# Patient Record
Sex: FEMALE | Race: BLACK OR AFRICAN AMERICAN | NOT HISPANIC OR LATINO | Employment: STUDENT | URBAN - METROPOLITAN AREA
[De-identification: names, ages, dates, MRNs, and addresses within clinical notes are randomized per-mention and may not be internally consistent; named-entity substitution may affect disease eponyms.]

---
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
## 2024-03-06 ENCOUNTER — PATIENT MESSAGE (OUTPATIENT)
Dept: PEDIATRIC NEUROLOGY | Facility: CLINIC | Age: 19
End: 2024-03-06
Payer: COMMERCIAL

## 2024-08-23 ENCOUNTER — PATIENT MESSAGE (OUTPATIENT)
Dept: PSYCHIATRY | Facility: CLINIC | Age: 19
End: 2024-08-23
Payer: COMMERCIAL

## 2024-08-23 ENCOUNTER — PATIENT MESSAGE (OUTPATIENT)
Dept: PEDIATRIC NEUROLOGY | Facility: CLINIC | Age: 19
End: 2024-08-23
Payer: COMMERCIAL

## 2024-09-05 ENCOUNTER — PATIENT MESSAGE (OUTPATIENT)
Dept: PEDIATRIC NEUROLOGY | Facility: CLINIC | Age: 19
End: 2024-09-05

## 2024-09-05 ENCOUNTER — OFFICE VISIT (OUTPATIENT)
Dept: PEDIATRIC NEUROLOGY | Facility: CLINIC | Age: 19
End: 2024-09-05
Payer: COMMERCIAL

## 2024-09-05 VITALS
HEIGHT: 66 IN | HEART RATE: 98 BPM | SYSTOLIC BLOOD PRESSURE: 116 MMHG | BODY MASS INDEX: 24.77 KG/M2 | DIASTOLIC BLOOD PRESSURE: 60 MMHG | WEIGHT: 154.13 LBS

## 2024-09-05 DIAGNOSIS — G40.019 LOCALIZATION-RELATED IDIOPATHIC EPILEPSY AND EPILEPTIC SYNDROMES WITH SEIZURES OF LOCALIZED ONSET, INTRACTABLE, WITHOUT STATUS EPILEPTICUS: Primary | ICD-10-CM

## 2024-09-05 DIAGNOSIS — Z96.89 S/P PLACEMENT OF VNS (VAGUS NERVE STIMULATION) DEVICE: ICD-10-CM

## 2024-09-05 PROCEDURE — 99999 PR PBB SHADOW E&M-EST. PATIENT-LVL III: CPT | Mod: PBBFAC,,, | Performed by: NURSE PRACTITIONER

## 2024-09-05 RX ORDER — OXCARBAZEPINE 600 MG/1
2 TABLET ORAL DAILY
Qty: 60 TABLET | Refills: 5 | Status: SHIPPED | OUTPATIENT
Start: 2024-09-05

## 2024-09-05 NOTE — PROGRESS NOTES
"  Subjective:      Patient ID: Praveen Shah is a 19 y.o. female with ADD and complex partial seizures presenting for follow up    Interval history:  Presents with mom  No seizures  Needs and MRI of her knee, inquiring about VNS device.  [employer] student, runs track  Injury occurred to her knee during school sports.      Interval history:  Last visit we raised Oxtellar to 1200 mg given having seizure auras.  Has done well since then with no auras or seizures.  Just needs refills.  VNS with no problems or pain/discomfort.  College student, plays softball for [employer].       Last seen by Dr. Rodriguez a year ago.  On Oxtellar 600 mg daily and has VNS  Last clinical seizure > 3 years ago.  She does report the last month or so she has been feeling her seizure aura of eye twitching 1 to 2 times a day.  She has not had a seizure though.  Goes to college, freshman at [employer].  She is driving at this time.      Interval History (11/23/2021):  She was last seen in 3/9/20. Last seizure was a couple of years ago. She had an spell of left side eyelid twitching "beeping" for few hours. She had GI issues around same time for one week when she had "baby pukes". She denies missing any medications.  She was seen in ED for this symptoms, labs unremarkable. She was discharged on anti emetics and her symptoms subsided after few days. She is here to check on VNS.  She is tolerating Oxtellar and VNS well w/o any side effects. She denies any new neurological deficits.   She is off adderrall.  We discussed about this episode less likely to be a seizure or seizure aura and more likely a muscle twitching in the setting of dehydration or electrolytes abnormality.       Previous HPI:  She was last seen on 8/19/19 by me. She was well-controlled with VNS (implanted in November 2015) on Oxtellar 600 mg daily at that time.     Last seizure in January 2018. VNS last interrogated 7/29/19 during admission for L-knee surgery; no changes " to settings made at that times.     She denies and dizziness, drowsiness, headache, or gait problems not related related to knee issues.     Last EEG - 6/05/19 (since patient requesting clearance to drive) Normal     24 hr EEG - 1/08/19 Normal  Routine EEG- 11/11/2021: Normal EEG awake and drowsy    Follow-up  Pertinent negatives include no chills, fatigue, headaches or weakness.   Seizures   Pertinent negatives include no headaches, no speech difficulty and no visual disturbance.       Review of Systems   Constitutional:  Negative for chills and fatigue.   HENT:  Negative for trouble swallowing.    Eyes:  Negative for visual disturbance.   Respiratory: Negative.     Cardiovascular: Negative.    Gastrointestinal: Negative.    Musculoskeletal: Negative.    Skin: Negative.    Neurological:  Negative for dizziness, seizures, speech difficulty, weakness, light-headedness and headaches.   Psychiatric/Behavioral:  Positive for dysphoric mood. Negative for agitation and behavioral problems.    All other systems reviewed and are negative.      Objective:   Neurologic Exam     Mental Status   Oriented to person, place, and time.   Attention: normal. Concentration: normal.   Speech: speech is normal   Level of consciousness: alert    Cranial Nerves     CN III, IV, VI   Pupils are equal, round, and reactive to light.  Extraocular motions are normal.   Nystagmus: none   Diplopia: none  Ophthalmoparesis: none    CN V   Facial sensation intact.     CN VII   Facial expression full, symmetric.     CN VIII   CN VIII normal.     CN IX, X   CN IX normal.     CN XI   CN XI normal.     CN XII   CN XII normal.     Motor Exam   Muscle bulk: normal  Overall muscle tone: normal    Strength   Strength 5/5 throughout.     Sensory Exam   Light touch normal.     Gait, Coordination, and Reflexes     Gait  Gait: normal    Coordination   Finger to nose coordination: normal    Tremor   Resting tremor: absent  Intention tremor: absent  Action  tremor: absent    Reflexes   Right brachioradialis: 2+  Left brachioradialis: 2+  Right biceps: 2+  Left biceps: 2+  Right triceps: 2+  Left triceps: 2+  Right patellar: 2+  Left patellar: 2+  Right achilles: 2+  Left achilles: 2+      Physical Exam  Constitutional:       General: She is not in acute distress.     Appearance: She is well-developed. She is not diaphoretic.   HENT:      Head: Normocephalic and atraumatic.   Eyes:      Extraocular Movements: EOM normal.      Pupils: Pupils are equal, round, and reactive to light.   Pulmonary:      Effort: Pulmonary effort is normal.   Musculoskeletal:         General: Normal range of motion.      Cervical back: Normal range of motion.   Neurological:      General: No focal deficit present.      Mental Status: She is oriented to person, place, and time. Mental status is at baseline.      Cranial Nerves: No cranial nerve deficit.      Motor: Motor strength is normal.No weakness.      Coordination: Coordination normal. Finger-Nose-Finger Test normal.      Gait: Gait is intact. Gait normal.      Deep Tendon Reflexes:      Reflex Scores:       Tricep reflexes are 2+ on the right side and 2+ on the left side.       Bicep reflexes are 2+ on the right side and 2+ on the left side.       Brachioradialis reflexes are 2+ on the right side and 2+ on the left side.       Patellar reflexes are 2+ on the right side and 2+ on the left side.       Achilles reflexes are 2+ on the right side and 2+ on the left side.  Psychiatric:         Mood and Affect: Mood is depressed.         Speech: Speech normal.         Behavior: Behavior is withdrawn.         Thought Content: Thought content normal.         Judgment: Judgment normal.       Pertinent Labs 6/2019:  Normal CMP and TFT    Assessment:   19 y.o. Female with ADD and complex focal seizures, on XR OXC and doing well. Will need VNS turned off for MRI; my staff can arrange. We discussed transition to adult neurology. I would not recommend  coming off medications. Its a combination of both medications and VNS that has made her seizure free.     Plan:   Cont Oxtellar 1200mg   Vns interrogated, no changes made.  Seizure precautions and seizure first aid were discussed with the patient  Family was instructed to contact either the primary care physician office or our office by telephone if there is any deterioration in his neurologic status, change in presenting symptoms, lack of beneficial response to treatment plan, or signs of adverse effects of current therapies, all of which were reviewed.    Transition to adult neurology        VNS interrogated:    VNS settings  Sentiva  S/N: [id number]  Implanted 11/13/20     OC mA                        2.0  Frequency Hz              30  Pulse width usec         250  ON sec                        30  OFF min                      5.0  Magnet OC                  2.25  Mag freq                      30  Mag PW                      250 to 500  Mag ON                       60  Auto-stim OC              2.0  A.S. PW                      250  A.S. ON                       60  Sensitivity                    20%  Impedence Ohms       1304  Battery                       50 to 75%      Grace Norman DNP, APRN, FNP-C  Pediatric Neurology Nurse Practitioner  Instructor of Pediatric Neurology     TIME SPENT IN ENCOUNTER : I spent 30 minutes face to face with the patient and family; > 50% was spent counseling them regarding findings from the available records including test/study results and their meaning, the diagnosis/differential diagnosis, diagnostic/treatment recommendations, therapeutic options, risks and benefits of management options, prognosis, plan/ instructions for management/use of medications, education, compliance and risk-factor reduction as well as in coordination of care and follow up plans.

## 2024-09-06 ENCOUNTER — OFFICE VISIT (OUTPATIENT)
Dept: PSYCHIATRY | Facility: CLINIC | Age: 19
End: 2024-09-06
Payer: COMMERCIAL

## 2024-09-06 VITALS
SYSTOLIC BLOOD PRESSURE: 121 MMHG | WEIGHT: 156.63 LBS | DIASTOLIC BLOOD PRESSURE: 67 MMHG | HEART RATE: 103 BPM | BODY MASS INDEX: 25.41 KG/M2

## 2024-09-06 DIAGNOSIS — F90.0 ADHD (ATTENTION DEFICIT HYPERACTIVITY DISORDER), INATTENTIVE TYPE: Primary | ICD-10-CM

## 2024-09-06 PROCEDURE — 99999 PR PBB SHADOW E&M-EST. PATIENT-LVL II: CPT | Mod: PBBFAC,,, | Performed by: PSYCHIATRY & NEUROLOGY

## 2024-09-06 RX ORDER — DEXTROAMPHETAMINE SACCHARATE, AMPHETAMINE ASPARTATE MONOHYDRATE, DEXTROAMPHETAMINE SULFATE AND AMPHETAMINE SULFATE 3.75; 3.75; 3.75; 3.75 MG/1; MG/1; MG/1; MG/1
15 CAPSULE, EXTENDED RELEASE ORAL DAILY
Qty: 30 CAPSULE | Refills: 0 | Status: SHIPPED | OUTPATIENT
Start: 2024-10-06 | End: 2024-11-05

## 2024-09-06 RX ORDER — DEXTROAMPHETAMINE SACCHARATE, AMPHETAMINE ASPARTATE MONOHYDRATE, DEXTROAMPHETAMINE SULFATE AND AMPHETAMINE SULFATE 3.75; 3.75; 3.75; 3.75 MG/1; MG/1; MG/1; MG/1
15 CAPSULE, EXTENDED RELEASE ORAL DAILY
Qty: 30 CAPSULE | Refills: 0 | Status: SHIPPED | OUTPATIENT
Start: 2024-11-05 | End: 2024-12-05

## 2024-09-06 RX ORDER — DEXTROAMPHETAMINE SACCHARATE, AMPHETAMINE ASPARTATE MONOHYDRATE, DEXTROAMPHETAMINE SULFATE AND AMPHETAMINE SULFATE 3.75; 3.75; 3.75; 3.75 MG/1; MG/1; MG/1; MG/1
15 CAPSULE, EXTENDED RELEASE ORAL DAILY
Qty: 30 CAPSULE | Refills: 0 | Status: SHIPPED | OUTPATIENT
Start: 2024-09-06 | End: 2024-10-06

## 2024-09-06 NOTE — PROGRESS NOTES
"Outpatient Psychiatry Follow-Up Visit with MD    9/6/2024    Last appointment:12/30/2022    Last in person appointment:9/6/2024    Clinical Status of Patient: Outpatient (Ambulatory)    IDENTIFYING DATA:    Child's Name: Praveen Shah  Grade: graduated high school in 2022 and freshman at [employer] 2022-23 and is prakash 2024-25  School:  [employer]  Parent:   Madison Nicolas  Grandparent    Consuelo,Celi  Mother    Alyssa, Smith  Father       The patient location is: [facility]  The chief complaint leading to consultation is: inattention    Visit type: in person     Face to Face time with patient: 20 minutes    30 minutes of total time spent on the encounter, which includes face to face time and non-face to face time preparing to see the patient (eg, review of tests), Obtaining and/or reviewing separately obtained history, Documenting clinical information in the electronic or other health record, Independently interpreting results (not separately reported) and communicating results to the patient/family/caregiver, or Care coordination (not separately reported).         Each patient to whom he or she provides medical services by telemedicine is:  (1) informed of the relationship between the physician and patient and the respective role of any other health care provider with respect to management of the patient; and (2) notified that he or she may decline to receive medical services by telemedicine and may withdraw from such care at any time.    Notes:      Site:  [facility]    Praveen Shah is a 19 y.o. female who has been lost to follow up. She was last seen 12/30/2022.    Chief Complaint:  "I am struggling in school without my Adderall XR."    Interval History and Content of Current Session:  Interim Events/Subjective Report/Content of Current Session:     "I need to get back on my Adderall XR because I just sit in class not paying attention at all."    "I am living in the dorm. I don't have a " "roommate."    "I run track but I got hurt. 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 Last seen 12/30/2022. Per [employer] last filled Adderall XR on 12/30/2022.    "School just started and I am just tired."    No new medications  No new medical issues  VNS replaced in 2020    L knee injury      "I am on scholarship for my knee."    "I am not depressed." Recent neuro note indicated such and when I asked about it she smiled brightly and said "I am not depressed at all."    "I am on track to graduate in 4 years."      Review of Systems   Review of Systems    No tic  No tremor  No drug use  No MJ      Past Medical, Family and Social History: The patient's past medical, family and social history have been reviewed and updated as appropriate within the electronic medical record - see encounter notes.    VNS  Oxtellar 600 mg BID    Compliance: N/A    Side effects: N/A    Risk Parameters:  Patient reports no suicidal ideation  Patient reports no homicidal ideation  Patient reports no self-injurious behavior  Patient reports no violent behavior    Wt Readings from Last 3 Encounters:   09/06/24 71.1 kg (156 lb 10.2 oz)   09/05/24 69.9 kg (154 lb 1.6 oz)   08/17/23 69.3 kg (152 lb 12.5 oz) (84%, Z= 0.99)*     * Growth percentiles are based on CDC (Girls, 2-20 Years) data.     Temp Readings from Last 3 Encounters:   11/18/21 98.1 °F (36.7 °C) (Temporal)   11/09/21 98.1 °F (36.7 °C) (Oral)   01/26/21 96.3 °F (35.7 °C) (Oral)     BP Readings from Last 3 Encounters:   09/06/24 121/67   09/05/24 116/60   08/17/23 110/65     Pulse Readings from Last 3 Encounters:   09/06/24 103   09/05/24 98   08/17/23 86       Exam (detailed: at least 9 elements; comprehensive: all 15 elements)   Constitutional  Vitals:  Most " recent vital signs, dated 9/6/2024, were reviewed.   There were no vitals filed for this visit.     General:  unremarkable, age appropriate, normal weight, casually dressed, neatly groomed     Musculoskeletal  Muscle Strength/Tone:  no tremor, no tic   Gait & Station:  non-ataxic     Psychiatric:  Appearance: unremarkable, age appropriate, casually dressed, neatly groomed  Behavior/Cooperation: normal, cooperative, eye contact normal  Speech: normal tone, normal rate, normal pitch, normal volume, spontaneous  Mood: steady, euthymic  Affect:  full  Thought Process: normal and logical  Thought Content: normal, no suicidality, no homicidality, delusions, or paranoia  Sensorium: person, place, situation, time/date, day of week, month of year, year  Alert and Oriented: x5  Memory: intact to recent and remote events  Attention/concentration: able to attend to interview  Abstract reasoning: age-appropriate  Insight: age-appropriate  Judgment: age-appropriate     No visits with results within 1 Month(s) from this visit.   Latest known visit with results is:   Lab Visit on 09/14/2022   Component Date Value Ref Range Status    Sodium 09/14/2022 138  136 - 145 mmol/L Final    Potassium 09/14/2022 3.9  3.5 - 5.1 mmol/L Final    Chloride 09/14/2022 108  95 - 110 mmol/L Final    CO2 09/14/2022 26  23 - 29 mmol/L Final    Glucose 09/14/2022 85  70 - 110 mg/dL Final    BUN 09/14/2022 11  6 - 20 mg/dL Final    Creatinine 09/14/2022 0.8  0.5 - 1.4 mg/dL Final    Calcium 09/14/2022 8.7  8.7 - 10.5 mg/dL Final    Total Protein 09/14/2022 7.1  6.0 - 8.4 g/dL Final    Albumin 09/14/2022 3.9  3.2 - 4.7 g/dL Final    Total Bilirubin 09/14/2022 0.3  0.1 - 1.0 mg/dL Final    Alkaline Phosphatase 09/14/2022 71  48 - 95 U/L Final    AST 09/14/2022 23  10 - 40 U/L Final    ALT 09/14/2022 13  10 - 44 U/L Final    Anion Gap 09/14/2022 4 (L)  8 - 16 mmol/L Final    eGFR 09/14/2022 SEE COMMENT  >60 mL/min/1.73 m^2 Final    Oxcarbazepine 09/14/2022  9 (L)  10 - 35 mcg/mL Final       Assessment and Diagnosis     General Impression:  She is studying excessive amounts and not keeping up with the work in school at time. Poor focus in the classroom makes for longer time studying attempting to catch up what was missed..      ICD-10-CM ICD-9-CM   1. ADHD (attention deficit hyperactivity disorder), inattentive type  F90.0 314.00     Intervention/Counseling/Treatment Plan   Adderall XR 15 mg daily     Return to Clinic: 3 months-virtual

## 2024-09-06 NOTE — LETTER
September 6, 2024    Pravene Shah  [address] [address]             [facility] [address]  Child and Adolescent Psychiatry  [facility]  [place] [address]  Phone: [phone]   September 6, 2024     Patient: Praveen Shah   YOB: 2005   Date of Visit: 9/6/2024       To Whom it May Concern:    Praveen Shah was seen in my clinic on 9/6/2024.    Please excuse her from any classes or work missed.    If you have any questions or concerns, please don't hesitate to call.    Sincerely,              Duke Vergara MD

## 2024-09-11 ENCOUNTER — PATIENT MESSAGE (OUTPATIENT)
Dept: PEDIATRIC NEUROLOGY | Facility: CLINIC | Age: 19
End: 2024-09-11
Payer: COMMERCIAL

## 2024-09-12 ENCOUNTER — TELEPHONE (OUTPATIENT)
Dept: PEDIATRIC NEUROLOGY | Facility: CLINIC | Age: 19
End: 2024-09-12
Payer: COMMERCIAL

## 2025-01-07 ENCOUNTER — OFFICE VISIT (OUTPATIENT)
Dept: PSYCHIATRY | Facility: CLINIC | Age: 20
End: 2025-01-07
Payer: COMMERCIAL

## 2025-01-07 VITALS
BODY MASS INDEX: 25.5 KG/M2 | SYSTOLIC BLOOD PRESSURE: 127 MMHG | WEIGHT: 157.19 LBS | DIASTOLIC BLOOD PRESSURE: 68 MMHG | HEART RATE: 95 BPM

## 2025-01-07 DIAGNOSIS — F40.298 SPECIFIC PHOBIA: ICD-10-CM

## 2025-01-07 DIAGNOSIS — F90.0 ADHD (ATTENTION DEFICIT HYPERACTIVITY DISORDER), INATTENTIVE TYPE: Primary | ICD-10-CM

## 2025-01-07 PROCEDURE — G2211 COMPLEX E/M VISIT ADD ON: HCPCS | Mod: S$GLB,,, | Performed by: PSYCHIATRY & NEUROLOGY

## 2025-01-07 PROCEDURE — 3008F BODY MASS INDEX DOCD: CPT | Mod: CPTII,S$GLB,, | Performed by: PSYCHIATRY & NEUROLOGY

## 2025-01-07 PROCEDURE — 99214 OFFICE O/P EST MOD 30 MIN: CPT | Mod: S$GLB,,, | Performed by: PSYCHIATRY & NEUROLOGY

## 2025-01-07 PROCEDURE — 3078F DIAST BP <80 MM HG: CPT | Mod: CPTII,S$GLB,, | Performed by: PSYCHIATRY & NEUROLOGY

## 2025-01-07 PROCEDURE — 3074F SYST BP LT 130 MM HG: CPT | Mod: CPTII,S$GLB,, | Performed by: PSYCHIATRY & NEUROLOGY

## 2025-01-07 PROCEDURE — 99999 PR PBB SHADOW E&M-EST. PATIENT-LVL II: CPT | Mod: PBBFAC,,, | Performed by: PSYCHIATRY & NEUROLOGY

## 2025-01-07 RX ORDER — DEXTROAMPHETAMINE SACCHARATE, AMPHETAMINE ASPARTATE MONOHYDRATE, DEXTROAMPHETAMINE SULFATE AND AMPHETAMINE SULFATE 5; 5; 5; 5 MG/1; MG/1; MG/1; MG/1
20 CAPSULE, EXTENDED RELEASE ORAL EVERY MORNING
Qty: 30 CAPSULE | Refills: 0 | Status: SHIPPED | OUTPATIENT
Start: 2025-03-08 | End: 2025-04-07

## 2025-01-07 RX ORDER — DEXTROAMPHETAMINE SACCHARATE, AMPHETAMINE ASPARTATE MONOHYDRATE, DEXTROAMPHETAMINE SULFATE AND AMPHETAMINE SULFATE 3.75; 3.75; 3.75; 3.75 MG/1; MG/1; MG/1; MG/1
15 CAPSULE, EXTENDED RELEASE ORAL DAILY
Qty: 30 CAPSULE | Refills: 0 | Status: CANCELLED | OUTPATIENT
Start: 2025-02-06 | End: 2025-03-08

## 2025-01-07 RX ORDER — DEXTROAMPHETAMINE SACCHARATE, AMPHETAMINE ASPARTATE MONOHYDRATE, DEXTROAMPHETAMINE SULFATE AND AMPHETAMINE SULFATE 3.75; 3.75; 3.75; 3.75 MG/1; MG/1; MG/1; MG/1
15 CAPSULE, EXTENDED RELEASE ORAL DAILY
Qty: 30 CAPSULE | Refills: 0 | Status: CANCELLED | OUTPATIENT
Start: 2025-03-08 | End: 2025-04-07

## 2025-01-07 RX ORDER — DEXTROAMPHETAMINE SACCHARATE, AMPHETAMINE ASPARTATE MONOHYDRATE, DEXTROAMPHETAMINE SULFATE AND AMPHETAMINE SULFATE 5; 5; 5; 5 MG/1; MG/1; MG/1; MG/1
20 CAPSULE, EXTENDED RELEASE ORAL EVERY MORNING
Qty: 30 CAPSULE | Refills: 0 | Status: SHIPPED | OUTPATIENT
Start: 2025-02-06 | End: 2025-03-08

## 2025-01-07 RX ORDER — DEXTROAMPHETAMINE SACCHARATE, AMPHETAMINE ASPARTATE MONOHYDRATE, DEXTROAMPHETAMINE SULFATE AND AMPHETAMINE SULFATE 5; 5; 5; 5 MG/1; MG/1; MG/1; MG/1
20 CAPSULE, EXTENDED RELEASE ORAL EVERY MORNING
Qty: 30 CAPSULE | Refills: 0 | Status: SHIPPED | OUTPATIENT
Start: 2025-01-07 | End: 2025-02-06

## 2025-01-07 RX ORDER — DEXTROAMPHETAMINE SACCHARATE, AMPHETAMINE ASPARTATE MONOHYDRATE, DEXTROAMPHETAMINE SULFATE AND AMPHETAMINE SULFATE 3.75; 3.75; 3.75; 3.75 MG/1; MG/1; MG/1; MG/1
15 CAPSULE, EXTENDED RELEASE ORAL DAILY
Qty: 30 CAPSULE | Refills: 0 | Status: CANCELLED | OUTPATIENT
Start: 2025-01-07 | End: 2025-02-06

## 2025-01-07 NOTE — PROGRESS NOTES
"Outpatient Psychiatry Follow-Up Visit with MD    1/7/2025    Last appointment:9/6/2024    Last in person appointment:1/7/2025    Clinical Status of Patient: Outpatient (Ambulatory)    IDENTIFYING DATA:    Child's Name: Praveen Shah  Grade: graduated high school in 2022 and freshman at [employer] 2022-23 and is prakash 2024-25  School:  [employer]  Parent:   Madison Nicolas  Grandparent    Consuelo,Celi  Mother    Alyssa, Smith  Father       The patient location is: [facility]  The chief complaint leading to consultation is: inattention    Visit type: in person     Face to Face time with patient: 20 minutes    30 minutes of total time spent on the encounter, which includes face to face time and non-face to face time preparing to see the patient (eg, review of tests), Obtaining and/or reviewing separately obtained history, Documenting clinical information in the electronic or other health record, Independently interpreting results (not separately reported) and communicating results to the patient/family/caregiver, or Care coordination (not separately reported).         Each patient to whom he or she provides medical services by telemedicine is:  (1) informed of the relationship between the physician and patient and the respective role of any other health care provider with respect to management of the patient; and (2) notified that he or she may decline to receive medical services by telemedicine and may withdraw from such care at any time.    Notes:      Site:  [facility]    Praveen Shah is a 19 y.o. female who has been lost to follow up. She is treated for inattention in the context of complex partial seizures. She has been seizure free for over 3 years. Has a VNS. Dealing with a knee injury at this time. Runs track and does softball at college but her injury has her out at this time.    Chief Complaint:  "I struggle in school without my Adderall XR. I go back tomorrow from " "break."    Interval History and Content of Current Session:  Interim Events/Subjective Report/Content of Current Session:       "I think I am ready for school to be done."    "I am going to run track."    "I am a prakash."    "I want to talk about my anxiety and I like I can't do public speaking and there is a lot of hesitation. 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 I had to do it. I could not just say no."    "I don't really want to be on medication for anxiety at this point."    "My ADHD medication is too low. I want to go up on the dose."       Per KAMINI last filled Adderall XR on 11/25/2024 at [facility].      No new medications  No new medical issues-L knee injury    VNS replaced in 2020    "I am not depressed." Recent neuro note 9/5/2024 indicated such and when I asked about it she smiled brightly and said "I am not depressed at all."              Review of Systems   Review of Systems    No tic  No tremor  No drug use  No MJ      Past Medical, Family and Social History: The patient's past medical, family and social history have been reviewed and updated as appropriate within the electronic medical record - see encounter notes.    VNS  Oxtellar 600 mg BID    Compliance: N/A    Side effects: N/A    Risk Parameters:  Patient reports no suicidal ideation  Patient reports no homicidal ideation  Patient reports no self-injurious behavior  Patient reports no violent behavior    Wt Readings from Last 3 Encounters:   01/07/25 71.3 kg (157 lb 3 oz)   09/06/24 71.1 kg (156 lb 10.2 oz)   09/05/24 69.9 kg (154 lb 1.6 oz)     Temp Readings from Last 3 Encounters:   11/18/21 98.1 °F (36.7 °C) (Temporal)   11/09/21 98.1 °F (36.7 °C) (Oral)   01/26/21 96.3 °F (35.7 " °C) (Oral)     BP Readings from Last 3 Encounters:   01/07/25 127/68   09/06/24 121/67   09/05/24 116/60     Pulse Readings from Last 3 Encounters:   01/07/25 95   09/06/24 103   09/05/24 98           Exam (detailed: at least 9 elements; comprehensive: all 15 elements)   Constitutional  Vitals:  Most recent vital signs, dated 9/6/2024, were reviewed.   Vitals:    01/07/25 0935   BP: 127/68   Pulse: 95   Weight: 71.3 kg (157 lb 3 oz)        General:  unremarkable, age appropriate, normal weight, casually dressed, neatly groomed     Musculoskeletal  Muscle Strength/Tone:  no tremor, no tic   Gait & Station:  non-ataxic     Psychiatric:  Appearance: unremarkable, age appropriate, casually dressed, neatly groomed  Behavior/Cooperation: normal, cooperative, eye contact normal  Speech: normal tone, normal rate, normal pitch, normal volume, spontaneous  Mood: steady, euthymic  Affect:  full  Thought Process: normal and logical  Thought Content: normal, no suicidality, no homicidality, delusions, or paranoia  Sensorium: person, place, situation, time/date, day of week, month of year, year  Alert and Oriented: x5  Memory: intact to recent and remote events  Attention/concentration: able to attend to interview  Abstract reasoning: age-appropriate  Insight: age-appropriate  Judgment: age-appropriate     No visits with results within 1 Month(s) from this visit.   Latest known visit with results is:   Lab Visit on 09/14/2022   Component Date Value Ref Range Status    Sodium 09/14/2022 138  136 - 145 mmol/L Final    Potassium 09/14/2022 3.9  3.5 - 5.1 mmol/L Final    Chloride 09/14/2022 108  95 - 110 mmol/L Final    CO2 09/14/2022 26  23 - 29 mmol/L Final    Glucose 09/14/2022 85  70 - 110 mg/dL Final    BUN 09/14/2022 11  6 - 20 mg/dL Final    Creatinine 09/14/2022 0.8  0.5 - 1.4 mg/dL Final    Calcium 09/14/2022 8.7  8.7 - 10.5 mg/dL Final    Total Protein 09/14/2022 7.1  6.0 - 8.4 g/dL Final    Albumin 09/14/2022 3.9  3.2 -  4.7 g/dL Final    Total Bilirubin 09/14/2022 0.3  0.1 - 1.0 mg/dL Final    Alkaline Phosphatase 09/14/2022 71  48 - 95 U/L Final    AST 09/14/2022 23  10 - 40 U/L Final    ALT 09/14/2022 13  10 - 44 U/L Final    Anion Gap 09/14/2022 4 (L)  8 - 16 mmol/L Final    eGFR 09/14/2022 SEE COMMENT  >60 mL/min/1.73 m^2 Final    Oxcarbazepine 09/14/2022 9 (L)  10 - 35 mcg/mL Final       Assessment and Diagnosis     General Impression:  She is studying excessive amounts and not keeping up with the work in school at time. Poor focus in the classroom makes for longer time studying attempting to catch up what was missed..      ICD-10-CM ICD-9-CM   1. ADHD (attention deficit hyperactivity disorder), inattentive type  F90.0 314.00   2. Specific phobia  F40.298 300.29     Intervention/Counseling/Treatment Plan   Adderall XR 20 mg daily     Return to Clinic: 3 months-virtual

## 2025-04-11 ENCOUNTER — PATIENT MESSAGE (OUTPATIENT)
Dept: PSYCHIATRY | Facility: CLINIC | Age: 20
End: 2025-04-11

## 2025-07-07 ENCOUNTER — OFFICE VISIT (OUTPATIENT)
Dept: PSYCHIATRY | Facility: CLINIC | Age: 20
End: 2025-07-07
Payer: COMMERCIAL

## 2025-07-07 DIAGNOSIS — F90.0 ADHD (ATTENTION DEFICIT HYPERACTIVITY DISORDER), INATTENTIVE TYPE: ICD-10-CM

## 2025-07-07 PROCEDURE — 98006 SYNCH AUDIO-VIDEO EST MOD 30: CPT | Mod: 95,,, | Performed by: PSYCHIATRY & NEUROLOGY

## 2025-07-07 PROCEDURE — G2211 COMPLEX E/M VISIT ADD ON: HCPCS | Mod: 95,,, | Performed by: PSYCHIATRY & NEUROLOGY

## 2025-07-07 RX ORDER — DEXTROAMPHETAMINE SACCHARATE, AMPHETAMINE ASPARTATE MONOHYDRATE, DEXTROAMPHETAMINE SULFATE AND AMPHETAMINE SULFATE 5; 5; 5; 5 MG/1; MG/1; MG/1; MG/1
20 CAPSULE, EXTENDED RELEASE ORAL EVERY MORNING
Qty: 30 CAPSULE | Refills: 0 | Status: SHIPPED | OUTPATIENT
Start: 2025-08-06 | End: 2025-09-05

## 2025-07-07 RX ORDER — DEXTROAMPHETAMINE SACCHARATE, AMPHETAMINE ASPARTATE MONOHYDRATE, DEXTROAMPHETAMINE SULFATE AND AMPHETAMINE SULFATE 5; 5; 5; 5 MG/1; MG/1; MG/1; MG/1
20 CAPSULE, EXTENDED RELEASE ORAL EVERY MORNING
Qty: 30 CAPSULE | Refills: 0 | Status: SHIPPED | OUTPATIENT
Start: 2025-09-05 | End: 2025-10-05

## 2025-07-07 RX ORDER — DEXTROAMPHETAMINE SACCHARATE, AMPHETAMINE ASPARTATE MONOHYDRATE, DEXTROAMPHETAMINE SULFATE AND AMPHETAMINE SULFATE 5; 5; 5; 5 MG/1; MG/1; MG/1; MG/1
20 CAPSULE, EXTENDED RELEASE ORAL EVERY MORNING
Qty: 30 CAPSULE | Refills: 0 | Status: SHIPPED | OUTPATIENT
Start: 2025-07-07 | End: 2025-08-06

## 2025-07-07 NOTE — PROGRESS NOTES
"Outpatient Psychiatry Follow-Up Visit with MD    7/7/2025    Last appointment:1/7/2025    Last in person appointment:1/7/2025    Clinical Status of Patient: Outpatient (Ambulatory)    IDENTIFYING DATA:    Child's Name: Praveen Shah  Grade: graduated high school in 2022 and freshman at [employer] 2022-23 and is prakash 2024-25 and a sophomore 2025-26   School:  [employer]  Parent:   Madison Nicolas  Grandparent    Consuelo,Celi  Mother    Alyssa, Smith  Father       The patient location is: [place], [place]  The chief complaint leading to consultation is: inattention    Visit type: audiovisual    Face to Face time with patient: 20 minutes    30 minutes of total time spent on the encounter, which includes face to face time and non-face to face time preparing to see the patient (eg, review of tests), Obtaining and/or reviewing separately obtained history, Documenting clinical information in the electronic or other health record, Independently interpreting results (not separately reported) and communicating results to the patient/family/caregiver, or Care coordination (not separately reported).         Each patient to whom he or she provides medical services by telemedicine is:  (1) informed of the relationship between the physician and patient and the respective role of any other health care provider with respect to management of the patient; and (2) notified that he or she may decline to receive medical services by telemedicine and may withdraw from such care at any time.    Notes:      Site:  [facility]    Praveen Shah is a 19 y.o. female who has been lost to follow up. She is treated for inattention in the context of complex partial seizures. She has been seizure free for over 3 years. Has a VNS. Dealing with a knee injury at this time. Runs track and does softball at college but her injury has her out at this time.    Chief Complaint:  "I am working a bunch this summer. 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 I graduate in May. I just have a year left. I am glad to be done."    No new medical issues.    "I am still taking the Adderall and I feel like it is helpful."    "My knee is OK but I do have problems with it all the time."      "I am just getting ready for the last year of school and then I am done!"    No depression.       Per [facility] last filled Adderall XR on 3/10/2025 at [facility].      No new medications  No new medical issues-L knee injury    VNS replaced in 2020                  Review of Systems   Review of Systems    No tic  No tremor  No drug use  No MJ      Past Medical, Family and Social History: The patient's past medical, family and social history have been reviewed and updated as appropriate within the electronic medical record - see encounter notes.    VNS  Oxtellar 600 mg BID    Compliance: N/A    Side effects: N/A    Risk Parameters:  Patient reports no suicidal ideation  Patient reports no homicidal ideation  Patient reports no self-injurious behavior  Patient reports no violent behavior    Wt Readings from Last 3 Encounters:   01/07/25 71.3 kg (157 lb 3 oz)   09/06/24 71.1 kg (156 lb 10.2 oz)   09/05/24 69.9 kg (154 lb 1.6 oz)     Temp Readings from Last 3 Encounters:   11/18/21 98.1 °F (36.7 °C) (Temporal)   11/09/21 98.1 °F (36.7 °C) (Oral)   01/26/21 96.3 °F (35.7 °C) (Oral)     BP Readings from Last 3 Encounters:   01/07/25 127/68   09/06/24 121/67   09/05/24 116/60     Pulse Readings from Last 3 Encounters:   01/07/25 95   09/06/24 103   09/05/24 98           Exam (detailed: at least 9 elements; comprehensive: all 15 elements)   Constitutional  Vitals:  Most recent vital signs, dated 1/7/2025, were reviewed.   There were no vitals filed for this visit.       General:  unremarkable, age appropriate, normal weight, casually dressed, neatly groomed "     Musculoskeletal  Muscle Strength/Tone:  no tremor, no tic   Gait & Station:  non-ataxic     Psychiatric:  Appearance: unremarkable, age appropriate, casually dressed, neatly groomed  Behavior/Cooperation: normal, cooperative, eye contact normal  Speech: normal tone, normal rate, normal pitch, normal volume, spontaneous  Mood: steady, euthymic  Affect:  full  Thought Process: normal and logical  Thought Content: normal, no suicidality, no homicidality, delusions, or paranoia  Sensorium: person, place, situation, time/date, day of week, month of year, year  Alert and Oriented: x5  Memory: intact to recent and remote events  Attention/concentration: able to attend to interview  Abstract reasoning: age-appropriate  Insight: age-appropriate  Judgment: age-appropriate     No visits with results within 1 Month(s) from this visit.   Latest known visit with results is:   Lab Visit on 09/14/2022   Component Date Value Ref Range Status    Sodium 09/14/2022 138  136 - 145 mmol/L Final    Potassium 09/14/2022 3.9  3.5 - 5.1 mmol/L Final    Chloride 09/14/2022 108  95 - 110 mmol/L Final    CO2 09/14/2022 26  23 - 29 mmol/L Final    Glucose 09/14/2022 85  70 - 110 mg/dL Final    BUN 09/14/2022 11  6 - 20 mg/dL Final    Creatinine 09/14/2022 0.8  0.5 - 1.4 mg/dL Final    Calcium 09/14/2022 8.7  8.7 - 10.5 mg/dL Final    Total Protein 09/14/2022 7.1  6.0 - 8.4 g/dL Final    Albumin 09/14/2022 3.9  3.2 - 4.7 g/dL Final    Total Bilirubin 09/14/2022 0.3  0.1 - 1.0 mg/dL Final    Alkaline Phosphatase 09/14/2022 71  48 - 95 U/L Final    AST 09/14/2022 23  10 - 40 U/L Final    ALT 09/14/2022 13  10 - 44 U/L Final    Anion Gap 09/14/2022 4 (L)  8 - 16 mmol/L Final    eGFR 09/14/2022 SEE COMMENT  >60 mL/min/1.73 m^2 Final    Oxcarbazepine 09/14/2022 9 (L)  10 - 35 mcg/mL Final       Assessment and Diagnosis     General Impression:  She was studying excessive amounts and yet still not keeping up with the work in school at time. Poor  focus in the classroom makes for longer time studying attempting to catch up what was missed. She reports improvements when taking Adderall XR in the she is much more efficient.       ICD-10-CM ICD-9-CM   1. ADHD (attention deficit hyperactivity disorder), inattentive type  F90.0 314.00       Intervention/Counseling/Treatment Plan   Adderall XR 20 mg daily     Return to Clinic: 3 months-virtual

## (undated) DEVICE — SUT VICRYL PLUS 3-0 SH 18IN

## (undated) DEVICE — PACK ARTHROSCOPY

## (undated) DEVICE — SOL IRR NACL .9% 3000ML

## (undated) DEVICE — SUT MCRYL PLUS 4-0 PS2 27IN

## (undated) DEVICE — TOURNIQUET HEMACLEAR X-LARGE

## (undated) DEVICE — BANDAGE ACE ELASTIC 6"

## (undated) DEVICE — ELECTRODE REM PLYHSV RETURN 9

## (undated) DEVICE — CORD BIPOLAR 12 FOOT

## (undated) DEVICE — DRAPE STERI U-SHAPED 47X51IN

## (undated) DEVICE — TUBE SET INFLOW/OUTFLOW

## (undated) DEVICE — ADHESIVE DERMABOND ADVANCED

## (undated) DEVICE — DRESSING TRANS 4X4 TEGADERM

## (undated) DEVICE — SEE MEDLINE ITEM 156905

## (undated) DEVICE — BLADE SURG CARBON STEEL SZ11

## (undated) DEVICE — DRESSING GAUZE 6PLY 4X4

## (undated) DEVICE — DURAPREP SURG SCRUB 26ML

## (undated) DEVICE — MARKER SKIN STND TIP BLUE BARR

## (undated) DEVICE — NDL SPINAL 18GX3.5 SPINOCAN

## (undated) DEVICE — DRAPE THYROID WITH ARMBOARD

## (undated) DEVICE — PADDING CAST 4IN SPECIALIST

## (undated) DEVICE — DRAPE PLASTIC U 60X72

## (undated) DEVICE — SUT VICRYL 3-0 27 SH

## (undated) DEVICE — BLADE SHAVER LANZA 4.2X13CM

## (undated) DEVICE — GAUZE SPONGE 4X4 12PLY

## (undated) DEVICE — DRESSING XEROFORM 1X8IN

## (undated) DEVICE — DRESSING SURGICAL 1/2X1/2

## (undated) DEVICE — DRAPE INCISE IOBAN 2 23X17IN

## (undated) DEVICE — TUBE FRAZIER 5MM 2FT SOFT TIP